# Patient Record
Sex: FEMALE | Race: WHITE | NOT HISPANIC OR LATINO | Employment: OTHER | URBAN - METROPOLITAN AREA
[De-identification: names, ages, dates, MRNs, and addresses within clinical notes are randomized per-mention and may not be internally consistent; named-entity substitution may affect disease eponyms.]

---

## 2021-05-19 ENCOUNTER — TELEPHONE (OUTPATIENT)
Dept: CARDIOLOGY | Facility: MEDICAL CENTER | Age: 66
End: 2021-05-19

## 2021-05-19 NOTE — TELEPHONE ENCOUNTER
Patient called back and stated no other cardiology. She did do an Echo and a NM stress test at [facility]. Dr Pulido was the ordering physician.     Thank you,    Maria T WHATLEY

## 2021-05-19 NOTE — TELEPHONE ENCOUNTER
Called [facility] to request ECHO and NM stress test, voicemail left on Medical records phone number requesting results to be faxed to [phone].

## 2021-05-19 NOTE — TELEPHONE ENCOUNTER
Chart Prep    Called patient in regards to records for NP appointment w/ to review most recent lab results, ekg, any cardiac testing or ,if he has been treated by a cardiologist . No answer, left voicemail to call back.

## 2021-05-25 ENCOUNTER — OFFICE VISIT (OUTPATIENT)
Dept: CARDIOLOGY | Facility: MEDICAL CENTER | Age: 66
End: 2021-05-25
Payer: MEDICARE

## 2021-05-25 VITALS
BODY MASS INDEX: 33.14 KG/M2 | HEART RATE: 85 BPM | SYSTOLIC BLOOD PRESSURE: 122 MMHG | RESPIRATION RATE: 14 BRPM | HEIGHT: 66 IN | WEIGHT: 206.2 LBS | DIASTOLIC BLOOD PRESSURE: 80 MMHG | OXYGEN SATURATION: 94 %

## 2021-05-25 DIAGNOSIS — R94.39 ABNORMAL STRESS TEST: ICD-10-CM

## 2021-05-25 DIAGNOSIS — R94.31 ABNORMAL ELECTROCARDIOGRAM (ECG) (EKG): ICD-10-CM

## 2021-05-25 DIAGNOSIS — R93.1 ABNORMAL NUCLEAR CARDIAC IMAGING TEST: ICD-10-CM

## 2021-05-25 DIAGNOSIS — E78.5 HYPERLIPIDEMIA, UNSPECIFIED HYPERLIPIDEMIA TYPE: ICD-10-CM

## 2021-05-25 DIAGNOSIS — I49.3 PVC (PREMATURE VENTRICULAR CONTRACTION): ICD-10-CM

## 2021-05-25 DIAGNOSIS — R06.09 DOE (DYSPNEA ON EXERTION): ICD-10-CM

## 2021-05-25 PROCEDURE — 99205 OFFICE O/P NEW HI 60 MIN: CPT | Mod: 25 | Performed by: INTERNAL MEDICINE

## 2021-05-25 PROCEDURE — 93000 ELECTROCARDIOGRAM COMPLETE: CPT | Performed by: INTERNAL MEDICINE

## 2021-05-25 ASSESSMENT — ENCOUNTER SYMPTOMS
NECK PAIN: 1
SHORTNESS OF BREATH: 1
ORTHOPNEA: 1
DIZZINESS: 0
BLURRED VISION: 0
INSOMNIA: 0
MYALGIAS: 0
FEVER: 0
PND: 0
PALPITATIONS: 0
ABDOMINAL PAIN: 0
LOSS OF CONSCIOUSNESS: 0
WEAKNESS: 1
CHILLS: 0
NAUSEA: 1

## 2021-05-25 NOTE — PROGRESS NOTES
"Chief Complaint   Patient presents with   • Hyperlipidemia     NP    • Abnormal Cardiac Function Testing       Subjective:   Alfredito Chahal is a 66 y.o. female from [place], [place] who presents today referred by her PCP Dilia Yee MD for cardiology consultation for evaluation of BEEBE and abnormal stress testing.    The patient was recently seen by her PCP on a normal annual follow-up evaluation.  At that time she reported some symptoms of intermittent exertional shortness of breath.  Also she had noted some intermittent though vague diffuse substernal chest discomfort.  This led to a treadmill stress test that was abnormal with the patient having limited exercise capacity completing only 3 minutes of a [place]on protocol evaded due to significant shortness of breath, achieving maximum heart rate of 162 with lateral ST segment depression.  Subsequently a pharmacologic MPI was performed on 5/10/2021 that showed normal wall motion, normal ejection fraction of 61% and \"photopenia which is relatively fixed with the exception of mild reversibility toward the base involving the apex and anterior wall.  Ischemia is not excluded\".    The patient has no prior history of heart disease.  She has a history of hiatal hernia, GERD with prior endoscopy , impaired fasting glucose with prior A1c of 5.6%, RACHID on CPAP therapy and obesity.    The patient's been on chronic aspirin therapy having been told of its health benefits.  No history of hypertension, hyperlipidemia or diabetes mellitus.  He previously smoked cigarettes but quit at age 28.  Recent blood work has been done results unknown per patient and not available in medical records.  Family history not significant for premature heart disease.  Father had heart attack at age 89.  Paternal grandfather  suddenly in late 70s.    History reviewed. No pertinent past medical history.  History reviewed. No pertinent surgical history.  Family History   Problem Relation Age of " Onset   • Heart Disease Father      Social History     Socioeconomic History   • Marital status:      Spouse name: Not on file   • Number of children: Not on file   • Years of education: Not on file   • Highest education level: Not on file   Occupational History   • Not on file   Tobacco Use   • Smoking status: Former Smoker     Types: Cigarettes   • Smokeless tobacco: Never Used   Substance and Sexual Activity   • Alcohol use: Yes     Comment: occ   • Drug use: Never   • Sexual activity: Not on file   Other Topics Concern   • Not on file   Social History Narrative   • Not on file     Social Determinants of Health     Financial Resource Strain:    • Difficulty of Paying Living Expenses:    Food Insecurity:    • Worried About Running Out of Food in the Last Year:    • Ran Out of Food in the Last Year:    Transportation Needs:    • Lack of Transportation (Medical):    • Lack of Transportation (Non-Medical):    Physical Activity:    • Days of Exercise per Week:    • Minutes of Exercise per Session:    Stress:    • Feeling of Stress :    Social Connections:    • Frequency of Communication with Friends and Family:    • Frequency of Social Gatherings with Friends and Family:    • Attends Mandaeism Services:    • Active Member of Clubs or Organizations:    • Attends Club or Organization Meetings:    • Marital Status:    Intimate Partner Violence:    • Fear of Current or Ex-Partner:    • Emotionally Abused:    • Physically Abused:    • Sexually Abused:      Allergies   Allergen Reactions   • Penicillins    • Shellfish Allergy      Outpatient Encounter Medications as of 5/25/2021   Medication Sig Dispense Refill   • aspirin EC (ECOTRIN) 81 MG Tablet Delayed Response Take 81 mg by mouth every day.     • Famotidine (PEPCID PO) Take  by mouth.       No facility-administered encounter medications on file as of 5/25/2021.     Review of Systems   Constitutional: Positive for malaise/fatigue. Negative for chills and fever.  "  HENT: Positive for hearing loss and tinnitus. Negative for congestion.    Eyes: Negative for blurred vision.   Respiratory: Positive for shortness of breath.    Cardiovascular: Positive for orthopnea. Negative for chest pain, palpitations, leg swelling and PND.   Gastrointestinal: Positive for nausea. Negative for abdominal pain.   Genitourinary: Negative for dysuria.   Musculoskeletal: Positive for joint pain and neck pain. Negative for myalgias.   Skin: Positive for itching. Negative for rash.   Neurological: Positive for weakness. Negative for dizziness and loss of consciousness.   Endo/Heme/Allergies: Positive for environmental allergies.   Psychiatric/Behavioral: The patient does not have insomnia.         Objective:   /80 (BP Location: Left arm, Patient Position: Sitting, BP Cuff Size: Adult)   Pulse 85   Resp 14   Ht 1.676 m (5' 6\")   Wt 93.5 kg (206 lb 3.2 oz)   SpO2 94%   BMI 33.28 kg/m²     Physical Exam   Constitutional: She is oriented to person, place, and time. She appears well-developed. No distress.   Eyes: Pupils are equal, round, and reactive to light. Conjunctivae are normal.   Neck: No JVD present.   Cardiovascular: Normal rate, regular rhythm and normal heart sounds. Exam reveals no gallop and no friction rub.   No murmur heard.  Pulses:       Carotid pulses are 2+ on the right side and 2+ on the left side.  Pulmonary/Chest: Effort normal and breath sounds normal. No accessory muscle usage. No respiratory distress. She has no wheezes. She has no rales.   Abdominal: Soft. She exhibits no distension and no mass. There is no abdominal tenderness.   Protuberant.   Musculoskeletal:      Cervical back: Normal range of motion and neck supple.   Neurological: She is alert and oriented to person, place, and time.   Skin: Skin is warm and dry. No rash noted. Nails show no clubbing.   Psychiatric: Her behavior is normal.   Vitals reviewed.    EKG 5/25/2021 sinus rhythm, rate 61.  Nonspecific " diffuse ST-T wave abnormalities.    ECHOCARDIOGRAM 4/21/2021  Normal left ventricular size, systolic function and wall motion.  Left ventricular ejection fraction 60%.  No significant valve disease.    Treadmill stress test and MPI results reviewed.  See above.    Assessment:     1. Hyperlipidemia, unspecified hyperlipidemia type  CANCELED: EKG   2. BEEBE (dyspnea on exertion)  CL-LEFT HEART CATHETERIZATION WITH POSSIBLE INTERVENTION    CL-RIGHT AND LEFT HEART CATHETERIZATION    CANCELED: EKG   3. Abnormal stress test  CL-LEFT HEART CATHETERIZATION WITH POSSIBLE INTERVENTION    CL-RIGHT AND LEFT HEART CATHETERIZATION    CANCELED: EKG   4. Abnormal nuclear cardiac imaging test  CL-LEFT HEART CATHETERIZATION WITH POSSIBLE INTERVENTION    CL-RIGHT AND LEFT HEART CATHETERIZATION    CANCELED: EKG   5. PVC (premature ventricular contraction)  CANCELED: EKG       Medical Decision Making:  Today's Assessment / Status / Plan:     Assessment  1.  BEEBE.  2.  Chest discomfort.  3.  Abnormal treadmill stress test.  4.  Abnormal nuclear stress test.  5.  Obesity.  6.  RACHID on CPAP therapy  7.  Hiatal hernia with GERD.    Recommendation Discussion  1.  I had a lengthy and detailed discussion with the patient in which we reviewed her recent development of BEEBE, results of the echocardiogram and the significance of the recent stress test and MPI results and discussed the implications primary issue being the concern of significant obstructive coronary artery disease.  She had previously discussed these results and her symptoms with her sister-in-law who is a retired RN and the issue of the consideration of cardiac catheterization.  We discussed alternatives as far as medical therapy other diagnostic tests i.e. coronary CTA versus proceeding with a diagnostic cardiac catheterization and the patient ultimately decided to proceed with a cardiac catheterization.  We discussed the potential findings of the angiogram including the possibility  "of normal coronary arteries, CAD which would lead to only medical therapy, possible need for coronary intervention or CABG.  The patient expressed her own awareness that the procedure is \"dangerous\" and I reviewed the potential risks of the procedure which include but are not limited to death, myocardial infarction, stroke, dye related complications i.e. allergic reaction or kidney failure, bleeding or infection.  She understood these issues and was agreeable to proceed.  Additionally a right heart catheterization will be ordered to exclude presence of occult diastolic dysfunction in the absence of significant coronary disease or pulmonary hypertension.    "

## 2021-05-26 ENCOUNTER — TELEPHONE (OUTPATIENT)
Dept: CARDIOLOGY | Facility: MEDICAL CENTER | Age: 66
End: 2021-05-26

## 2021-05-26 DIAGNOSIS — Z01.812 PRE-PROCEDURE LAB EXAM: ICD-10-CM

## 2021-05-26 NOTE — TELEPHONE ENCOUNTER
Patient is scheduled on 6-8-21 for a R&L hrt cath with Dr. Whitfield. No meds to stop and patient to check in at 6:30 for an 8:30 procedure. H&P was done on 5-25-21 by Dr. Roberts. Pre admit to call patient to do a telephone pre admit. Patient to get COVID test done in [facility] on 6-2-21.

## 2021-05-26 NOTE — TELEPHONE ENCOUNTER
----- Message from Freeman Hoang sent at 5/26/2021  2:33 PM PDT -----  Regarding: COVID test order needed to be faxed  Please fax a COVID/SARS COV-2 PCR order to [facility] fax # [phone] for patient to have her covd test done there on 6-2-21 for her angio with us.

## 2021-05-26 NOTE — TELEPHONE ENCOUNTER
Covid test ordered.   Faxed to [facility].  Received fax confirmation.  Scanned into [employer].

## 2021-05-28 LAB — EKG IMPRESSION: NORMAL

## 2021-06-02 ENCOUNTER — PRE-ADMISSION TESTING (OUTPATIENT)
Dept: ADMISSIONS | Facility: MEDICAL CENTER | Age: 66
End: 2021-06-02
Attending: INTERNAL MEDICINE
Payer: MEDICARE

## 2021-06-02 RX ORDER — ASPIRIN 325 MG
325 TABLET ORAL EVERY 6 HOURS PRN
COMMUNITY
End: 2022-06-20

## 2021-06-07 ENCOUNTER — PRE-ADMISSION TESTING (OUTPATIENT)
Dept: ADMISSIONS | Facility: MEDICAL CENTER | Age: 66
End: 2021-06-07
Attending: INTERNAL MEDICINE
Payer: MEDICARE

## 2021-06-07 DIAGNOSIS — Z01.810 PRE-OPERATIVE CARDIOVASCULAR EXAMINATION: ICD-10-CM

## 2021-06-07 DIAGNOSIS — Z01.812 PRE-OPERATIVE LABORATORY EXAMINATION: ICD-10-CM

## 2021-06-07 LAB
ALBUMIN SERPL BCP-MCNC: 4.1 G/DL (ref 3.2–4.9)
ALBUMIN/GLOB SERPL: 1.3 G/DL
ALP SERPL-CCNC: 67 U/L (ref 30–99)
ALT SERPL-CCNC: 17 U/L (ref 2–50)
ANION GAP SERPL CALC-SCNC: 10 MMOL/L (ref 7–16)
APTT PPP: 27.7 SEC (ref 24.7–36)
AST SERPL-CCNC: 17 U/L (ref 12–45)
BILIRUB SERPL-MCNC: 0.2 MG/DL (ref 0.1–1.5)
BUN SERPL-MCNC: 13 MG/DL (ref 8–22)
CALCIUM SERPL-MCNC: 9.6 MG/DL (ref 8.5–10.5)
CHLORIDE SERPL-SCNC: 104 MMOL/L (ref 96–112)
CO2 SERPL-SCNC: 26 MMOL/L (ref 20–33)
CREAT SERPL-MCNC: 1 MG/DL (ref 0.5–1.4)
ERYTHROCYTE [DISTWIDTH] IN BLOOD BY AUTOMATED COUNT: 40.2 FL (ref 35.9–50)
GLOBULIN SER CALC-MCNC: 3.2 G/DL (ref 1.9–3.5)
GLUCOSE SERPL-MCNC: 114 MG/DL (ref 65–99)
HCT VFR BLD AUTO: 47.7 % (ref 37–47)
HGB BLD-MCNC: 15.1 G/DL (ref 12–16)
INR PPP: 1.01 (ref 0.87–1.13)
MCH RBC QN AUTO: 27.6 PG (ref 27–33)
MCHC RBC AUTO-ENTMCNC: 31.7 G/DL (ref 33.6–35)
MCV RBC AUTO: 87.2 FL (ref 81.4–97.8)
PLATELET # BLD AUTO: 291 K/UL (ref 164–446)
PMV BLD AUTO: 10.1 FL (ref 9–12.9)
POTASSIUM SERPL-SCNC: 4.1 MMOL/L (ref 3.6–5.5)
PROT SERPL-MCNC: 7.3 G/DL (ref 6–8.2)
PROTHROMBIN TIME: 13.6 SEC (ref 12–14.6)
RBC # BLD AUTO: 5.47 M/UL (ref 4.2–5.4)
SODIUM SERPL-SCNC: 140 MMOL/L (ref 135–145)
WBC # BLD AUTO: 7.3 K/UL (ref 4.8–10.8)

## 2021-06-07 PROCEDURE — 85610 PROTHROMBIN TIME: CPT

## 2021-06-07 PROCEDURE — 80053 COMPREHEN METABOLIC PANEL: CPT

## 2021-06-07 PROCEDURE — 85027 COMPLETE CBC AUTOMATED: CPT

## 2021-06-07 PROCEDURE — 36415 COLL VENOUS BLD VENIPUNCTURE: CPT

## 2021-06-07 PROCEDURE — 93005 ELECTROCARDIOGRAM TRACING: CPT

## 2021-06-07 PROCEDURE — 85730 THROMBOPLASTIN TIME PARTIAL: CPT

## 2021-06-08 ENCOUNTER — HOSPITAL ENCOUNTER (OUTPATIENT)
Facility: MEDICAL CENTER | Age: 66
End: 2021-06-08
Attending: INTERNAL MEDICINE | Admitting: INTERNAL MEDICINE
Payer: MEDICARE

## 2021-06-08 ENCOUNTER — APPOINTMENT (OUTPATIENT)
Dept: CARDIOLOGY | Facility: MEDICAL CENTER | Age: 66
End: 2021-06-08
Attending: INTERNAL MEDICINE
Payer: MEDICARE

## 2021-06-08 VITALS
OXYGEN SATURATION: 93 % | WEIGHT: 203.26 LBS | RESPIRATION RATE: 18 BRPM | BODY MASS INDEX: 32.67 KG/M2 | TEMPERATURE: 98.7 F | HEIGHT: 66 IN | DIASTOLIC BLOOD PRESSURE: 56 MMHG | SYSTOLIC BLOOD PRESSURE: 97 MMHG | HEART RATE: 64 BPM

## 2021-06-08 DIAGNOSIS — R94.39 ABNORMAL STRESS TEST: ICD-10-CM

## 2021-06-08 DIAGNOSIS — R93.1 ABNORMAL NUCLEAR CARDIAC IMAGING TEST: ICD-10-CM

## 2021-06-08 DIAGNOSIS — R06.09 DOE (DYSPNEA ON EXERTION): ICD-10-CM

## 2021-06-08 LAB — EKG IMPRESSION: NORMAL

## 2021-06-08 PROCEDURE — 700117 HCHG RX CONTRAST REV CODE 255: Performed by: INTERNAL MEDICINE

## 2021-06-08 PROCEDURE — 93460 R&L HRT ART/VENTRICLE ANGIO: CPT | Mod: 26 | Performed by: INTERNAL MEDICINE

## 2021-06-08 PROCEDURE — 700111 HCHG RX REV CODE 636 W/ 250 OVERRIDE (IP)

## 2021-06-08 PROCEDURE — 99152 MOD SED SAME PHYS/QHP 5/>YRS: CPT | Performed by: INTERNAL MEDICINE

## 2021-06-08 PROCEDURE — 93010 ELECTROCARDIOGRAM REPORT: CPT | Mod: 59 | Performed by: INTERNAL MEDICINE

## 2021-06-08 PROCEDURE — 99153 MOD SED SAME PHYS/QHP EA: CPT

## 2021-06-08 PROCEDURE — 160002 HCHG RECOVERY MINUTES (STAT)

## 2021-06-08 PROCEDURE — 700101 HCHG RX REV CODE 250

## 2021-06-08 RX ORDER — SODIUM CHLORIDE 9 MG/ML
INJECTION, SOLUTION INTRAVENOUS CONTINUOUS
Status: DISCONTINUED | OUTPATIENT
Start: 2021-06-08 | End: 2021-06-08 | Stop reason: HOSPADM

## 2021-06-08 RX ORDER — VERAPAMIL HYDROCHLORIDE 2.5 MG/ML
INJECTION, SOLUTION INTRAVENOUS
Status: COMPLETED
Start: 2021-06-08 | End: 2021-06-08

## 2021-06-08 RX ORDER — MIDAZOLAM HYDROCHLORIDE 1 MG/ML
INJECTION INTRAMUSCULAR; INTRAVENOUS
Status: COMPLETED
Start: 2021-06-08 | End: 2021-06-08

## 2021-06-08 RX ORDER — HEPARIN SODIUM 200 [USP'U]/100ML
INJECTION, SOLUTION INTRAVENOUS
Status: COMPLETED
Start: 2021-06-08 | End: 2021-06-08

## 2021-06-08 RX ORDER — LIDOCAINE HYDROCHLORIDE 20 MG/ML
INJECTION, SOLUTION INFILTRATION; PERINEURAL
Status: COMPLETED
Start: 2021-06-08 | End: 2021-06-08

## 2021-06-08 RX ORDER — HEPARIN SODIUM 1000 [USP'U]/ML
INJECTION, SOLUTION INTRAVENOUS; SUBCUTANEOUS
Status: COMPLETED
Start: 2021-06-08 | End: 2021-06-08

## 2021-06-08 RX ORDER — DIPHENHYDRAMINE HYDROCHLORIDE 50 MG/ML
INJECTION INTRAMUSCULAR; INTRAVENOUS
Status: COMPLETED
Start: 2021-06-08 | End: 2021-06-08

## 2021-06-08 RX ADMIN — FENTANYL CITRATE 50 MCG: 50 INJECTION, SOLUTION INTRAMUSCULAR; INTRAVENOUS at 10:01

## 2021-06-08 RX ADMIN — NITROGLYCERIN 10 ML: 20 INJECTION INTRAVENOUS at 09:46

## 2021-06-08 RX ADMIN — VERAPAMIL HYDROCHLORIDE 2.5 MG: 2.5 INJECTION, SOLUTION INTRAVENOUS at 09:45

## 2021-06-08 RX ADMIN — LIDOCAINE HYDROCHLORIDE: 20 INJECTION, SOLUTION INFILTRATION; PERINEURAL at 09:46

## 2021-06-08 RX ADMIN — HEPARIN SODIUM: 1000 INJECTION, SOLUTION INTRAVENOUS; SUBCUTANEOUS at 09:46

## 2021-06-08 RX ADMIN — MIDAZOLAM HYDROCHLORIDE 2 MG: 1 INJECTION, SOLUTION INTRAMUSCULAR; INTRAVENOUS at 09:45

## 2021-06-08 RX ADMIN — IOHEXOL 48 ML: 350 INJECTION, SOLUTION INTRAVENOUS at 10:15

## 2021-06-08 RX ADMIN — HEPARIN SODIUM: 200 INJECTION, SOLUTION INTRAVENOUS at 09:00

## 2021-06-08 RX ADMIN — DIPHENHYDRAMINE HYDROCHLORIDE 12.5 MG: 50 INJECTION INTRAMUSCULAR; INTRAVENOUS at 09:45

## 2021-06-08 ASSESSMENT — FIBROSIS 4 INDEX: FIB4 SCORE: 0.94

## 2021-06-08 NOTE — PROCEDURES
REFERRING PHYSICIAN: Dr. Roberts    PREOPERATIVE DIAGNOSIS:  1. BEEBE  2. Atypical chest pain  3. Abnormal stress test    POSTOPERATIVE DIAGNOSIS:  1.  Mild nonobstructive coronary disease  2.  Normal left-ventricular function, LVEDP 13 mmHg  3.  Normal right heart pressures      PROCEDURE PERFORMED:  Selective coronary angiography of the native vessels  Left heart catheterization  Left ventriculogram  Right heart catheterization  Supervision moderate sedation  DESCRIPTION OF PROCEDURE:  The risks and benefits of cardiac catheterization as well as the procedure itself, rationale and appropriateness were discussed with the patient today. Complications including but not limited to death, stroke, MI, urgent bypass surgery, contrast nephropathy, vascular complications, bleeding and infection were explained to the patient. The potential outcomes associated with the procedure (possible PCI, possible CABG, possible medical Rx only) were also discussed at length. The patient agreed to proceed.    The patient was transported to the catheterization laboratory in the fasting state. The right radial area and right groin were prepped and draped in the usual fashion. Right radial area was entered with a single through and through puncture under direct ultrasound guidance and a 6F glide sheath was placed. An intra-arterial cocktail of heparin, verapamil and nitroglycerine was administered. Over a wire, a 5F Elena catheter was passed to the aortic root and used to engage the right and left coronaries without difficulty. Contrast was administered and multiple images obtained. This catheter was then used to cross the aortic valve for LHC and contrast was administered at 8cc/s for 24cc's for left ventriculogram.  Subsequently through the existing right brachial IV over wire a 6 Congolese sheath was exchanged and placed into the sheath a 6 Congolese [place] catheter was advanced to the right heart for right heart catheterization.  All  catheter was removed.  All catheters and guidewires were removed and a TR band was applied to achieve patent hemostasis. Patient left the cath lab in stable condition.      Moderate sedation directly monitored by me during the case while supervising the administration of the sedation medication by an independent trained RN to assist in the monitoring of the patient's level of consciousness and physiological status. I, the supervising physician was present the entire time from beginning of medication administration until the end of the procedure from 9:32 AM until 9:59 AM. For detailed administration records please see the moderate sedation documentation in the median tab.      FINDINGS:  HEMODYNAMIC FINDINGS:  AoP  106/64 mmHg   LVEDP  13 mmHg   RA  9 mmHg   RV  31/6 mmHg   RVEDP  10 mmHg   PA  32/17 mmHg   mPAP  24 mmHg   PCWP  12 mmHg   TDCO  5.1 L/min       II. LEFT VENTRICULOGRAM:   LVEF ARMSTRONG PROJECTION: 70%     III. CORONARY ANGIOGRAPHY:  Left Main: Moderate caliber no significant CAD.  Left Anterior Descending: Moderate caliber usual complement of tiny diagonals.  Tortuous.  Mild nonobstructive epicardial CAD.  Left Circumflex: Small caliber nondominant no CAD.  Right Coronary: Moderate caliber and dominant supplying the RPDA.  There is mild nonobstructive CAD with a 40% proximal stenosis that is focal as the worst area of obstruction.    COMPLICATIONS: none apparent      Following the procedure I discussed the results with the patient and the patients designated contact/family member Gabe at [phone].

## 2021-06-08 NOTE — OR NURSING
1021 Pt brought into PPU by cath lab RN.   AAOx4.  Denies pain and nausea.  TR band CDI and soft.  Denies numbness/tingling in hand and fingers.    1120 2cc air removed from TR band.  No signs of active bleeding.    1135 3cc air removed from TR band.  No signs of active bleeding.    1150 Remaining air removed from TR band.  No signs of active bleeding. TR band removed.  CDI dressing placed.    1155 Discharge instructions given to patient and .  Discussed diet, wound care, and plan of care.  All questions answered.  IV d/c'd.    1212 Pt wheeled out by RN via wheelchair.  Sent home to responsible adult.

## 2021-06-08 NOTE — DISCHARGE INSTRUCTIONS
ACTIVITY: Rest and take it easy for the first 24 hours.  A responsible adult is recommended to remain with you during that time.  It is normal to feel sleepy.  We encourage you to not do anything that requires balance, judgment or coordination.    MILD FLU-LIKE SYMPTOMS ARE NORMAL. YOU MAY EXPERIENCE GENERALIZED MUSCLE ACHES, THROAT IRRITATION, HEADACHE AND/OR SOME NAUSEA.    FOR 24 HOURS DO NOT:  Drive, operate machinery or run household appliances.  Drink beer or alcoholic beverages.   Make important decisions or sign legal documents.    SPECIAL INSTRUCTIONS: POST ANGIOGRAM  General Care Instructions  • Maintain a bandage over the incision site for 24 hours.  It's normal to find a small bruise or dime-sized lump at the insertion site. This should disappear within a few weeks.  • Do not apply lotions or powders to the site.  Do not immerse the catheter insertion site in water (bathtub/swimming) for five days. It is ok to shower 24 hours after the procedure.  • You may resume your normal diet immediately; on the day of your procedure, drink 6-10 glasses of water to help flush the contrast liquid out of your system.  • If the doctor inserted the catheter in your arm:  o For 24 hours, DO NOT lift anything heavier than 10 pounds (approximately a gallon of milk). DO NOT do any heavy pushing, pulling, or twisting.    Medications  • If your current medications need to be changed, you will be provided with an updated list of your medications prior to discharge.  • If you take warfarin (Coumadin), resume taking your usual dose the evening after the procedure.  • DO NOT STOP taking prescribed blood thinning (anti-platelet) medications unless instructed by your cardiologist.  These medications include:  o Aspirin, Clopidogrel (Plavix), Ticagrelor (Brilinta), or Prasugrel (Effient)   • If you take one of the following anticoagulants, RESUME 24 HOURS after your procedure:  o Apixiban (Eliquis), Rivaroxaban (Xarelto),  Dabigatran (Pradaxa), Edoxaban (Savaysa)  • If you take metformin (Glucophage), RESUME 48 HOURS after your procedure.    When to call your healthcare provider  Call your cardiologist right away at [phone] if you have any of the following:  ?  Problems/Concerns taking any of your prescribed heart medicines.  ?  The insertion site has increasing pain, swelling, redness, bleeding, or drainage.  ?  Your arm or leg below where the insertion site changes color, is cool, or is numb.  ?  You have chest pain or shortness of breath that does not go away with rest.  ?  Your pulse feels irregular -- very slow (less than 60 beats/minute) or very fast (over 100 beats/minute).  ?  You have dizziness, fainting, or you are very tired.  ?  You are coughing up blood or yellow or green mucus.  ?  You have chills or a fever over 101°F (38.3°C).   ?  If there is bleeding at the catheter insertion site, apply pressure for 10 minutes.  If bleeding persists, call 911, and continue to hold pressure until advanced medical support arrives.        Exercising Safely After Percutaneous Coronary Intervention (PCI)  After percutaneous coronary intervention (PCI), which involves angioplasty and often stenting, it's important to focus on your heart health. Exercise can help strengthen your heart. It can also help you feel good and improve your overall health. Talk with your health care provider or cardiac rehab team member about good options for you.  • Start slowly. Work up to more vigorous exercise as you get stronger. Aim for at least 150 minutes of exercise each week.  • Include aerobic activities. These make the heart beat faster. They work the heart and lungs, and improve the body's ability to use oxygen. Good choices include walking, swimming, and biking .  Always follow your doctor's recommendation for exercise.   You have been referred to cardiac rehabilitation, which is important for your recovery.  You may contact [facility]'s Intensive  Cardiac Rehab Program at [phone] to learn more and schedule a visit.        Lifestyle Management After Percutaneous Coronary Intervention (PCI)  Percutaneous coronary intervention (PCI)  involves angioplasty and often stenting. This procedure can open arteries and relieve symptoms. But, it doesn't cure coronary artery disease. New blockages can still form. You need to take steps to prevent this by managing risk factors. Doing so will help make your heart and arteries healthier. Your doctor may prescribe cardiac rehabilitation to help with this lifelong process.  Understanding risk factors  Some risk factors for coronary artery disease can be controlled. These include smoking, high blood pressure, cholesterol, diabetes, and obesity. They can be managed with medication, diet, and exercise. Support and counseling can also play a role. The effort will pay off! Managing risk factors can help you be more active, feel better, and reduce the risk of heart attack.    If you smoke, quit!  If your doctor has been urging you to quit smoking, it's for good reasons. Smoking damages your heart, blood vessels, and lungs. The good news is that quitting can halt or even reverse the damage of smoking. To quit now:  • Get medical help. Ask your doctor for advice on stop-smoking programs. Also ask about medications or nicotine replacement therapy products that may help you quit smoking.  • Get support. Join a support group. Ask for help from your family and friends.  • Don't give up. It often takes several tries to succeed in quitting smoking.  • Avoid secondhand smoke. Ask family and friends not to smoke around you.        DIET: To avoid nausea, slowly advance diet as tolerated, avoiding spicy or greasy foods for the first day.  Add more substantial food to your diet according to your physician's instructions.  Babies can be fed formula or breast milk as soon as they are hungry.  INCREASE FLUIDS AND FIBER TO AVOID  CONSTIPATION.    SURGICAL DRESSING/BATHING: Keep dressing clean and dry for 24 hours.  May remove dressing on 06/09/21 after 1:00PM.  You do not need to replace the dressing.  Do not submerge area in water (no swimming, tub baths, etc.) for 7 days.    FOLLOW-UP APPOINTMENT:  A follow-up appointment should be arranged with your doctor in 1-2 weeks; call to schedule.    You should CALL YOUR PHYSICIAN if you develop:  Fever greater than 101 degrees F.  Pain not relieved by medication, or persistent nausea or vomiting.  Excessive bleeding (blood soaking through dressing) or unexpected drainage from the wound.  Extreme redness or swelling around the incision site, drainage of pus or foul smelling drainage.  Inability to urinate or empty your bladder within 8 hours.  Problems with breathing or chest pain.    You should call 911 if you develop problems with breathing or chest pain.  If you are unable to contact your doctor or surgical center, you should go to the nearest emergency room or urgent care center.  Physician's telephone #: [id number] [phone]    If any questions arise, call your doctor.  If your doctor is not available, please feel free to call the Surgical Center at [phone]. The Contact Center is open Monday through Friday 7AM to 5PM and may speak to a nurse at [phone], or toll free at [phone].     A registered nurse may call you a few days after your surgery to see how you are doing after your procedure.    MEDICATIONS: Resume taking daily medication.  Take prescribed pain medication with food.  If no medication is prescribed, you may take non-aspirin pain medication if needed.  PAIN MEDICATION CAN BE VERY CONSTIPATING.  Take a stool softener or laxative such as senokot, pericolace, or milk of magnesia if needed.    If your physician has prescribed pain medication that includes Acetaminophen (Tylenol), do not take additional Acetaminophen (Tylenol) while taking the prescribed  medication.    Depression / Suicide Risk    As you are discharged from this [facility] facility, it is important to learn how to keep safe from harming yourself.    Recognize the warning signs:  · Abrupt changes in personality, positive or negative- including increase in energy   · Giving away possessions  · Change in eating patterns- significant weight changes-  positive or negative  · Change in sleeping patterns- unable to sleep or sleeping all the time   · Unwillingness or inability to communicate  · Depression  · Unusual sadness, discouragement and loneliness  · Talk of wanting to die  · Neglect of personal appearance   · Rebelliousness- reckless behavior  · Withdrawal from people/activities they love  · Confusion- inability to concentrate     If you or a loved one observes any of these behaviors or has concerns about self-harm, here's what you can do:  · Talk about it- your feelings and reasons for harming yourself  · Remove any means that you might use to hurt yourself (examples: pills, rope, extension cords, firearm)  · Get professional help from the community (Mental Health, Substance Abuse, psychological counseling)  · Do not be alone:Call your Safe Contact- someone whom you trust who will be there for you.  · Call your local CRISIS HOTLINE 290-2867 or 822-882-9899  · Call your local Children's Mobile Crisis Response Team [place] (736) 198-2567 or www.Exosect  · Call the toll free National Suicide Prevention Hotlines   · National Suicide Prevention Lifeline 529-481-PCLD (2206)  · National Hope Line Network 800-SUICIDE (971-7279)

## 2021-06-16 ENCOUNTER — OFFICE VISIT (OUTPATIENT)
Dept: CARDIOLOGY | Facility: MEDICAL CENTER | Age: 66
End: 2021-06-16
Payer: MEDICARE

## 2021-06-16 VITALS
HEIGHT: 66 IN | DIASTOLIC BLOOD PRESSURE: 72 MMHG | SYSTOLIC BLOOD PRESSURE: 114 MMHG | OXYGEN SATURATION: 94 % | HEART RATE: 98 BPM | WEIGHT: 206 LBS | BODY MASS INDEX: 33.11 KG/M2

## 2021-06-16 DIAGNOSIS — I25.10 CORONARY ARTERY DISEASE INVOLVING NATIVE CORONARY ARTERY OF NATIVE HEART WITHOUT ANGINA PECTORIS: ICD-10-CM

## 2021-06-16 PROCEDURE — 99214 OFFICE O/P EST MOD 30 MIN: CPT | Performed by: INTERNAL MEDICINE

## 2021-06-16 RX ORDER — LORATADINE 10 MG/1
10 TABLET ORAL PRN
COMMUNITY

## 2021-06-16 RX ORDER — ZOLEDRONIC ACID 5 MG/100ML
5 INJECTION, SOLUTION INTRAVENOUS
COMMUNITY

## 2021-06-16 ASSESSMENT — FIBROSIS 4 INDEX: FIB4 SCORE: 0.94

## 2021-06-16 NOTE — PROGRESS NOTES
Chief Complaint   Patient presents with   • Hyperlipidemia     follow up       Subjective:   Alfredito Chahal is a 66 y.o. female who presents today scheduled in error with me instead of her primary cardiologist Dr. Roberts post procedure.  Performed right and left heart catheterization at the request of Dr. Roberts for dyspnea on exertion.  Noted mild nonobstructive CAD and normal right heart pressures.  She has gained weight recently.  Her cholesterol profile is abnormal but the results are not available.  She mentions visual scintillations have occurred a couple of times and at the same prodrome she has had prior to her migraines, but did not develop subsequent cephalgia this time.    Past Medical History:   Diagnosis Date   • Anesthesia    • Asthma     seasonal    • Sleep apnea     CPAP   • Urinary incontinence      Past Surgical History:   Procedure Laterality Date   • REPEAT C SECTION  1986   • PRIMARY C SECTION  1984   • APPENDECTOMY       Family History   Problem Relation Age of Onset   • Heart Disease Father      Social History     Socioeconomic History   • Marital status:      Spouse name: Not on file   • Number of children: Not on file   • Years of education: Not on file   • Highest education level: Not on file   Occupational History   • Not on file   Tobacco Use   • Smoking status: Former Smoker     Types: Cigarettes   • Smokeless tobacco: Never Used   • Tobacco comment: quit when 28 y old   Vaping Use   • Vaping Use: Never used   Substance and Sexual Activity   • Alcohol use: Yes     Comment: occ   • Drug use: Never   • Sexual activity: Not on file   Other Topics Concern   • Not on file   Social History Narrative   • Not on file     Social Determinants of Health     Financial Resource Strain:    • Difficulty of Paying Living Expenses:    Food Insecurity:    • Worried About Running Out of Food in the Last Year:    • Ran Out of Food in the Last Year:    Transportation Needs:    • Lack of  "Transportation (Medical):    • Lack of Transportation (Non-Medical):    Physical Activity:    • Days of Exercise per Week:    • Minutes of Exercise per Session:    Stress:    • Feeling of Stress :    Social Connections:    • Frequency of Communication with Friends and Family:    • Frequency of Social Gatherings with Friends and Family:    • Attends [employer] Services:    • Active Member of Clubs or Organizations:    • Attends Club or Organization Meetings:    • Marital Status:    Intimate Partner Violence:    • Fear of Current or Ex-Partner:    • Emotionally Abused:    • Physically Abused:    • Sexually Abused:      Allergies   Allergen Reactions   • Penicillins Anaphylaxis     anaphilactic   • Shellfish Allergy Rash     rash     Outpatient Encounter Medications as of 6/16/2021   Medication Sig Dispense Refill   • zoledronic Acid (RECLAST) 5 MG/100ML Solution IVPB premix Infuse 5 mg into a venous catheter Once Every 12 Months.     • loratadine (CLARITIN) 10 MG Tab Take 10 mg by mouth as needed.     • aspirin (ASA) 325 MG Tab Take 325 mg by mouth every 6 hours as needed.     • aspirin EC (ECOTRIN) 81 MG Tablet Delayed Response Take 81 mg by mouth every day.     • Famotidine (PEPCID PO) Take  by mouth.       No facility-administered encounter medications on file as of 6/16/2021.     Review of Systems   All other systems reviewed and are negative.       Objective:   /72 (BP Location: Left arm, Patient Position: Sitting)   Pulse 98   Ht 1.676 m (5' 6\")   Wt 93.4 kg (206 lb)   SpO2 94%   BMI 33.25 kg/m²     Physical Exam   Constitutional: She is oriented to person, place, and time. She appears well-developed. No distress.   HENT:   Head: Normocephalic and atraumatic.   Right Ear: External ear normal.   Left Ear: External ear normal.   Mouth/Throat: No oropharyngeal exudate.   Eyes: Pupils are equal, round, and reactive to light. Conjunctivae are normal. Right eye exhibits no discharge. Left eye exhibits no " discharge. No scleral icterus.   Neck: No JVD present. No tracheal deviation present. No thyromegaly present.   Cardiovascular: Normal rate, regular rhythm, S1 normal, S2 normal and normal heart sounds. PMI is not displaced. Exam reveals no gallop, no S3, no S4 and no friction rub.   No murmur heard.  Pulses:       Carotid pulses are 2+ on the right side and 2+ on the left side.       Radial pulses are 2+ on the right side and 2+ on the left side.        Popliteal pulses are 2+ on the right side and 2+ on the left side.        Dorsalis pedis pulses are 2+ on the right side and 2+ on the left side.        Posterior tibial pulses are 2+ on the right side and 2+ on the left side.   Pulmonary/Chest: Effort normal and breath sounds normal. No respiratory distress. She has no wheezes. She has no rales. She exhibits no tenderness.   Abdominal: Soft. Bowel sounds are normal. She exhibits no distension. There is no abdominal tenderness.   Musculoskeletal:         General: No tenderness. Normal range of motion.      Cervical back: Normal range of motion and neck supple.   Neurological: She is alert and oriented to person, place, and time. No cranial nerve deficit (Cranial nerves II through XII grossly intact).   Skin: Skin is warm and dry. No rash noted. She is not diaphoretic. No erythema.   Psychiatric: Her behavior is normal. Judgment and thought content normal.   Vitals reviewed.      Assessment:     1. Coronary artery disease involving native coronary artery of native heart without angina pectoris         Medical Decision Making:  Today's Assessment / Status / Plan:     Mild nonobstructive CAD.  Recommend goal LDL less than 70.  She would like to try diet and exercise recommend a 6-month trial diet and exercise and if unable to achieve lipid targets initiation of statin therapy as an augment.  Continue aspirin 81 mg daily.  Follow-up with Dr. Roberts her primary cardiologist.  Her dyspnea on exertion is not related to  a cardiac limitation.  Discussed increasing her physical activity as well this visit.

## 2021-06-16 NOTE — LETTER
[facility]   [address], [facility] [address]  [place] [address]  Phone: [phone]  Fax: [phone]              Alfredito Krueger Tirso  1955    Encounter Date: 6/16/2021    Jp Whitfield M.D.          PROGRESS NOTE:  No notes on file      Dilia Yee M.D.  [address] [address]  Via Fax: [phone]

## 2022-03-09 ENCOUNTER — TELEPHONE (OUTPATIENT)
Dept: CARDIOLOGY | Facility: MEDICAL CENTER | Age: 67
End: 2022-03-09
Payer: COMMERCIAL

## 2022-03-09 DIAGNOSIS — R06.09 DOE (DYSPNEA ON EXERTION): ICD-10-CM

## 2022-03-09 DIAGNOSIS — E78.5 HYPERLIPIDEMIA, UNSPECIFIED HYPERLIPIDEMIA TYPE: ICD-10-CM

## 2022-03-09 DIAGNOSIS — R94.39 ABNORMAL STRESS TEST: ICD-10-CM

## 2022-03-09 DIAGNOSIS — R94.31 ABNORMAL ELECTROCARDIOGRAM (ECG) (EKG): ICD-10-CM

## 2022-03-09 DIAGNOSIS — R93.1 ABNORMAL NUCLEAR CARDIAC IMAGING TEST: ICD-10-CM

## 2022-03-09 DIAGNOSIS — I49.3 PVC (PREMATURE VENTRICULAR CONTRACTION): ICD-10-CM

## 2022-03-09 DIAGNOSIS — I25.10 CORONARY ARTERY DISEASE INVOLVING NATIVE CORONARY ARTERY OF NATIVE HEART WITHOUT ANGINA PECTORIS: ICD-10-CM

## 2022-03-09 NOTE — TELEPHONE ENCOUNTER
TW     Received call from specialty , states that patient would like labs sent to address on file. Please advise.    Thank you,  Gopal CORONA.

## 2022-03-10 NOTE — TELEPHONE ENCOUNTER
Per TW last OV 06/16/21:  Medical Decision Making:  Today's Assessment / Status / Plan:      Mild nonobstructive CAD.  Recommend goal LDL less than 70.  She would like to try diet and exercise recommend a 6-month trial diet and exercise and if unable to achieve lipid targets initiation of statin therapy as an augment.  Continue aspirin 81 mg daily.  Follow-up with Dr. Roberts her primary cardiologist.  Her dyspnea on exertion is not related to a cardiac limitation.  Discussed increasing her physical activity as well this visit.    Routine cardiac lab orders placed under SW. Pt has [facility] on 06/20/22. Lab slips mailed out to pt's address on file.

## 2022-05-31 ENCOUNTER — TELEPHONE (OUTPATIENT)
Dept: CARDIOLOGY | Facility: MEDICAL CENTER | Age: 67
End: 2022-05-31
Payer: COMMERCIAL

## 2022-05-31 NOTE — TELEPHONE ENCOUNTER
Called patient in regards to standing lab order. Patient states she will have blood work done prior to appointment with  @ [facility]. Confirmed patient's appointment date, time and location.

## 2022-06-06 DIAGNOSIS — I25.10 CORONARY ARTERY DISEASE INVOLVING NATIVE CORONARY ARTERY OF NATIVE HEART WITHOUT ANGINA PECTORIS: ICD-10-CM

## 2022-06-06 DIAGNOSIS — R94.39 ABNORMAL STRESS TEST: ICD-10-CM

## 2022-06-06 DIAGNOSIS — R06.09 DOE (DYSPNEA ON EXERTION): ICD-10-CM

## 2022-06-06 DIAGNOSIS — R93.1 ABNORMAL NUCLEAR CARDIAC IMAGING TEST: ICD-10-CM

## 2022-06-06 DIAGNOSIS — I49.3 PVC (PREMATURE VENTRICULAR CONTRACTION): ICD-10-CM

## 2022-06-06 DIAGNOSIS — R94.31 ABNORMAL ELECTROCARDIOGRAM (ECG) (EKG): ICD-10-CM

## 2022-06-20 ENCOUNTER — OFFICE VISIT (OUTPATIENT)
Dept: CARDIOLOGY | Facility: MEDICAL CENTER | Age: 67
End: 2022-06-20
Payer: MEDICARE

## 2022-06-20 VITALS
HEIGHT: 66 IN | OXYGEN SATURATION: 95 % | RESPIRATION RATE: 16 BRPM | HEART RATE: 98 BPM | SYSTOLIC BLOOD PRESSURE: 120 MMHG | BODY MASS INDEX: 31.82 KG/M2 | DIASTOLIC BLOOD PRESSURE: 70 MMHG | WEIGHT: 198 LBS

## 2022-06-20 DIAGNOSIS — E78.5 DYSLIPIDEMIA: ICD-10-CM

## 2022-06-20 DIAGNOSIS — I25.10 CORONARY ARTERY DISEASE, NON-OCCLUSIVE: ICD-10-CM

## 2022-06-20 PROBLEM — R06.09 DOE (DYSPNEA ON EXERTION): Status: RESOLVED | Noted: 2021-05-25 | Resolved: 2022-06-20

## 2022-06-20 PROCEDURE — 99214 OFFICE O/P EST MOD 30 MIN: CPT | Performed by: INTERNAL MEDICINE

## 2022-06-20 RX ORDER — NAPROXEN 250 MG/1
TABLET ORAL
COMMUNITY
Start: 2022-04-26

## 2022-06-20 RX ORDER — ROSUVASTATIN CALCIUM 20 MG/1
TABLET, COATED ORAL
COMMUNITY
Start: 2022-04-26

## 2022-06-20 ASSESSMENT — ENCOUNTER SYMPTOMS
COUGH: 0
SHORTNESS OF BREATH: 0
PALPITATIONS: 0
DIZZINESS: 0
MYALGIAS: 0
LOSS OF CONSCIOUSNESS: 0

## 2022-06-20 ASSESSMENT — FIBROSIS 4 INDEX: FIB4 SCORE: 0.95

## 2022-06-20 NOTE — PROGRESS NOTES
Chief Complaint   Patient presents with   • Hyperlipidemia     F/V Dx: Hyperlipidemia, unspecified hyperlipidemia type   • Coronary Artery Disease     F/V Dx: Coronary artery disease involving native coronary artery of native heart without angina pectoris       Subjective     Alfredito Chahal is a 67 y.o. female from [place], [place] who presents today for follow-up of nonobstructive CAD, dyslipidemia and abnormal MPI.    Since I originally saw the patient on 2020 in consultation for an abnormal MPI done at [facility] in [place], [place] she underwent a diagnostic cardiac catheterization by Dr. Jp Whitfield which showed nonobstructive coronary artery disease managed with aspirin and rosuvastatin.  The patient denies any anginal symptoms.  She is on at daily home 40-minute low impact exercise program during which time she has no symptoms.  She also walks her dog on a regular basis without any limitations.  Still struggles and wishes she could lose more weight     She has a history of hiatal hernia, GERD with prior endoscopy , impaired fasting glucose with prior A1c of 5.6%, RACHID on CPAP therapy and obesity.     The patient's been on chronic aspirin therapy having been told of its health benefits.  No history of hypertension, hyperlipidemia or diabetes mellitus.  She previously smoked cigarettes but quit at age 28.  Family history not significant for premature heart disease.  Father had heart attack at age 89.  Paternal grandfather  suddenly in late 70s.    Past Medical History:   Diagnosis Date   • Anesthesia    • Asthma     seasonal    • Sleep apnea     CPAP   • Urinary incontinence      Past Surgical History:   Procedure Laterality Date   • REPEAT C SECTION     • PRIMARY C SECTION     • APPENDECTOMY       Family History   Problem Relation Age of Onset   • Heart Disease Father      Social History     Socioeconomic History   • Marital status:      Spouse name: Not on  file   • Number of children: Not on file   • Years of education: Not on file   • Highest education level: Not on file   Occupational History   • Not on file   Tobacco Use   • Smoking status: Former Smoker     Types: Cigarettes   • Smokeless tobacco: Never Used   • Tobacco comment: quit when 28 y old   Vaping Use   • Vaping Use: Never used   Substance and Sexual Activity   • Alcohol use: Yes     Comment: occ   • Drug use: Never   • Sexual activity: Not on file   Other Topics Concern   • Not on file   Social History Narrative   • Not on file     Social Determinants of Health     Financial Resource Strain: Not on file   Food Insecurity: Not on file   Transportation Needs: Not on file   Physical Activity: Not on file   Stress: Not on file   Social Connections: Not on file   Intimate Partner Violence: Not on file   Housing Stability: Not on file     Allergies   Allergen Reactions   • Penicillins Anaphylaxis     anaphilactic   • Shellfish Allergy Rash     rash     Outpatient Encounter Medications as of 6/20/2022   Medication Sig Dispense Refill   • naproxen (NAPROSYN) 250 MG Tab      • rosuvastatin (CRESTOR) 20 MG Tab      • zoledronic Acid (RECLAST) 5 MG/100ML Solution IVPB premix Infuse 5 mg into a venous catheter Once Every 12 Months.     • loratadine (CLARITIN) 10 MG Tab Take 10 mg by mouth as needed.     • aspirin EC (ECOTRIN) 81 MG Tablet Delayed Response Take 81 mg by mouth every day.     • Famotidine (PEPCID PO) Take  by mouth.     • aspirin (ASA) 325 MG Tab Take 325 mg by mouth every 6 hours as needed.       No facility-administered encounter medications on file as of 6/20/2022.     Review of Systems   Respiratory: Negative for cough and shortness of breath.    Cardiovascular: Negative for chest pain and palpitations.   Musculoskeletal: Negative for myalgias.   Neurological: Negative for dizziness and loss of consciousness.              Objective     /70 (BP Location: Left arm, Patient Position: Sitting, BP  "Cuff Size: Adult)   Pulse 98   Resp 16   Ht 1.676 m (5' 6\")   Wt 89.8 kg (198 lb)   SpO2 95%   BMI 31.96 kg/m²     Physical Exam  Vitals reviewed.   Constitutional:       General: She is not in acute distress.     Appearance: She is well-developed.   Eyes:      Conjunctiva/sclera: Conjunctivae normal.      Pupils: Pupils are equal, round, and reactive to light.   Neck:      Vascular: No JVD.   Cardiovascular:      Rate and Rhythm: Normal rate and regular rhythm.      Pulses:           Carotid pulses are 2+ on the right side and 2+ on the left side.     Heart sounds: Normal heart sounds. No murmur heard.    No friction rub. No gallop.   Pulmonary:      Effort: Pulmonary effort is normal. No accessory muscle usage or respiratory distress.      Breath sounds: Normal breath sounds. No wheezing or rales.   Abdominal:      General: There is no distension.      Palpations: Abdomen is soft. There is no mass.      Tenderness: There is no abdominal tenderness.   Musculoskeletal:      Cervical back: Normal range of motion and neck supple.      Right lower leg: No edema.      Left lower leg: No edema.   Skin:     General: Skin is warm and dry.      Findings: No rash.      Nails: There is no clubbing.   Neurological:      Mental Status: She is alert and oriented to person, place, and time.   Psychiatric:         Behavior: Behavior normal.                     CARDIAC CATHETERIZATION 6/8/2021  PREOPERATIVE DIAGNOSIS:  1. BEEBE  2. Atypical chest pain  3. Abnormal stress test     POSTOPERATIVE DIAGNOSIS:  1.  Mild nonobstructive coronary disease  2.  Normal left-ventricular function, LVEDP 13 mmHg  3.  Normal right heart pressures              CORONARY ANGIOGRAPHY:  Left Main: Moderate caliber no significant CAD.  Left Anterior Descending: Moderate caliber usual complement of tiny diagonals.  Tortuous.  Mild nonobstructive epicardial CAD.  Left Circumflex: Small caliber nondominant no CAD.  Right Coronary: Moderate caliber and " dominant supplying the RPDA.  There is mild nonobstructive CAD with a 40% proximal stenosis that is focal as the worst area of obstruction.    HEMODYNAMIC FINDINGS:  AoP  106/64 mmHg   LVEDP  13 mmHg   RA  9 mmHg   RV  31/6 mmHg   RVEDP  10 mmHg   PA  32/17 mmHg   mPAP  24 mmHg   PCWP  12 mmHg   TDCO  5.1 L/min      LEFT VENTRICULOGRAM:              LVEF ARMSTRONG PROJECTION: 70%      EKG 5/25/2021 sinus rhythm, rate 61.  Nonspecific diffuse ST-T wave abnormalities.     ECHOCARDIOGRAM 4/21/2021  Normal left ventricular size, systolic function and wall motion.  Left ventricular ejection fraction 60%.  No significant valve disease.     Treadmill stress test and MPI results reviewed.  See above.    Assessment & Plan     1. Coronary artery disease, non-occlusive     2. Dyslipidemia         Medical Decision Making: Today's Assessment/Status/Plan:   Assessment  1.  Coronary artery disease, nonobstructive, LVEF 70%, normal right heart pressures  2.  Dyslipidemia.  3.  Obesity.  4.  RACHID on CPAP therapy  5.  Hiatal hernia with GERD.    Recommendation Discussion  1.  The patient is stable from a cardiac standpoint concerning her nonobstructive coronary artery disease and dyslipidemia.  2.  Reviewed recent blood work which shows , , HDL 55, LDL 74.  3.  LDL goal is to achieve an LDL less than 70, options reviewed which would include 1-increase rosuvastatin dose from 20 mg to 40 mg a day or 2-further dietary modification which the patient thinks there could be room for progress and then recheck LDL in 3 to 4 months and if LDL not less than 70 then increase rosuvastatin dose at that time, she will follow-up with her PCP in [place], [place].  4.  Does not need regular cardiology follow-up.